# Patient Record
Sex: FEMALE | Race: BLACK OR AFRICAN AMERICAN | Employment: OTHER | URBAN - METROPOLITAN AREA
[De-identification: names, ages, dates, MRNs, and addresses within clinical notes are randomized per-mention and may not be internally consistent; named-entity substitution may affect disease eponyms.]

---

## 2024-03-15 RX ORDER — AMLODIPINE BESYLATE 5 MG/1
TABLET ORAL
COMMUNITY
Start: 2023-11-01 | End: 2024-03-19 | Stop reason: ALTCHOICE

## 2024-03-15 RX ORDER — NEBIVOLOL 10 MG/1
TABLET ORAL
COMMUNITY
Start: 2023-11-01

## 2024-03-15 RX ORDER — OMEPRAZOLE 20 MG/1
TABLET, DELAYED RELEASE ORAL
COMMUNITY
Start: 2024-03-10

## 2024-03-15 RX ORDER — AMLODIPINE BESYLATE VALSARTAN HYDROCHLOROTHIAZIDE 10; 25; 320 MG/1; MG/1; MG/1
TABLET, FILM COATED ORAL
COMMUNITY
Start: 2023-11-01 | End: 2024-03-19

## 2024-03-15 SDOH — ECONOMIC STABILITY: FOOD INSECURITY: WITHIN THE PAST 12 MONTHS, YOU WORRIED THAT YOUR FOOD WOULD RUN OUT BEFORE YOU GOT MONEY TO BUY MORE.: NEVER TRUE

## 2024-03-15 SDOH — ECONOMIC STABILITY: HOUSING INSECURITY
IN THE LAST 12 MONTHS, WAS THERE A TIME WHEN YOU DID NOT HAVE A STEADY PLACE TO SLEEP OR SLEPT IN A SHELTER (INCLUDING NOW)?: NO

## 2024-03-15 SDOH — ECONOMIC STABILITY: INCOME INSECURITY: HOW HARD IS IT FOR YOU TO PAY FOR THE VERY BASICS LIKE FOOD, HOUSING, MEDICAL CARE, AND HEATING?: NOT VERY HARD

## 2024-03-15 SDOH — ECONOMIC STABILITY: TRANSPORTATION INSECURITY
IN THE PAST 12 MONTHS, HAS LACK OF TRANSPORTATION KEPT YOU FROM MEETINGS, WORK, OR FROM GETTING THINGS NEEDED FOR DAILY LIVING?: NO

## 2024-03-15 SDOH — ECONOMIC STABILITY: FOOD INSECURITY: WITHIN THE PAST 12 MONTHS, THE FOOD YOU BOUGHT JUST DIDN'T LAST AND YOU DIDN'T HAVE MONEY TO GET MORE.: NEVER TRUE

## 2024-03-15 SDOH — HEALTH STABILITY: PHYSICAL HEALTH: ON AVERAGE, HOW MANY DAYS PER WEEK DO YOU ENGAGE IN MODERATE TO STRENUOUS EXERCISE (LIKE A BRISK WALK)?: 0 DAYS

## 2024-03-15 SDOH — HEALTH STABILITY: PHYSICAL HEALTH: ON AVERAGE, HOW MANY MINUTES DO YOU ENGAGE IN EXERCISE AT THIS LEVEL?: 0 MIN

## 2024-03-15 ASSESSMENT — PATIENT HEALTH QUESTIONNAIRE - PHQ9
9. THOUGHTS THAT YOU WOULD BE BETTER OFF DEAD, OR OF HURTING YOURSELF: NOT AT ALL
6. FEELING BAD ABOUT YOURSELF - OR THAT YOU ARE A FAILURE OR HAVE LET YOURSELF OR YOUR FAMILY DOWN: NOT AT ALL
5. POOR APPETITE OR OVEREATING: NOT AT ALL
SUM OF ALL RESPONSES TO PHQ QUESTIONS 1-9: 5
SUM OF ALL RESPONSES TO PHQ QUESTIONS 1-9: 5
7. TROUBLE CONCENTRATING ON THINGS, SUCH AS READING THE NEWSPAPER OR WATCHING TELEVISION: NOT AT ALL
5. POOR APPETITE OR OVEREATING: NOT AT ALL
2. FEELING DOWN, DEPRESSED OR HOPELESS: SEVERAL DAYS
1. LITTLE INTEREST OR PLEASURE IN DOING THINGS: SEVERAL DAYS
3. TROUBLE FALLING OR STAYING ASLEEP: NOT AT ALL
7. TROUBLE CONCENTRATING ON THINGS, SUCH AS READING THE NEWSPAPER OR WATCHING TELEVISION: NOT AT ALL
SUM OF ALL RESPONSES TO PHQ QUESTIONS 1-9: 5
SUM OF ALL RESPONSES TO PHQ9 QUESTIONS 1 & 2: 2
10. IF YOU CHECKED OFF ANY PROBLEMS, HOW DIFFICULT HAVE THESE PROBLEMS MADE IT FOR YOU TO DO YOUR WORK, TAKE CARE OF THINGS AT HOME, OR GET ALONG WITH OTHER PEOPLE: SOMEWHAT DIFFICULT
8. MOVING OR SPEAKING SO SLOWLY THAT OTHER PEOPLE COULD HAVE NOTICED. OR THE OPPOSITE - BEING SO FIDGETY OR RESTLESS THAT YOU HAVE BEEN MOVING AROUND A LOT MORE THAN USUAL: NOT AT ALL
1. LITTLE INTEREST OR PLEASURE IN DOING THINGS: SEVERAL DAYS
2. FEELING DOWN, DEPRESSED OR HOPELESS: SEVERAL DAYS
4. FEELING TIRED OR HAVING LITTLE ENERGY: NEARLY EVERY DAY
SUM OF ALL RESPONSES TO PHQ QUESTIONS 1-9: 5
4. FEELING TIRED OR HAVING LITTLE ENERGY: NEARLY EVERY DAY
6. FEELING BAD ABOUT YOURSELF - OR THAT YOU ARE A FAILURE OR HAVE LET YOURSELF OR YOUR FAMILY DOWN: NOT AT ALL
10. IF YOU CHECKED OFF ANY PROBLEMS, HOW DIFFICULT HAVE THESE PROBLEMS MADE IT FOR YOU TO DO YOUR WORK, TAKE CARE OF THINGS AT HOME, OR GET ALONG WITH OTHER PEOPLE: SOMEWHAT DIFFICULT
9. THOUGHTS THAT YOU WOULD BE BETTER OFF DEAD, OR OF HURTING YOURSELF: NOT AT ALL
3. TROUBLE FALLING OR STAYING ASLEEP: NOT AT ALL
8. MOVING OR SPEAKING SO SLOWLY THAT OTHER PEOPLE COULD HAVE NOTICED. OR THE OPPOSITE, BEING SO FIGETY OR RESTLESS THAT YOU HAVE BEEN MOVING AROUND A LOT MORE THAN USUAL: NOT AT ALL
SUM OF ALL RESPONSES TO PHQ QUESTIONS 1-9: 5

## 2024-03-19 ENCOUNTER — OFFICE VISIT (OUTPATIENT)
Age: 69
End: 2024-03-19
Payer: MEDICARE

## 2024-03-19 ENCOUNTER — HOSPITAL ENCOUNTER (OUTPATIENT)
Facility: HOSPITAL | Age: 69
Discharge: HOME | End: 2024-03-22
Payer: MEDICARE

## 2024-03-19 VITALS
BODY MASS INDEX: 50.03 KG/M2 | TEMPERATURE: 99.4 F | RESPIRATION RATE: 18 BRPM | HEIGHT: 61 IN | SYSTOLIC BLOOD PRESSURE: 108 MMHG | HEART RATE: 82 BPM | WEIGHT: 265 LBS | OXYGEN SATURATION: 98 % | DIASTOLIC BLOOD PRESSURE: 80 MMHG

## 2024-03-19 DIAGNOSIS — R60.9 PITTING EDEMA: ICD-10-CM

## 2024-03-19 DIAGNOSIS — L97.911 ULCER OF RIGHT LOWER EXTREMITY, LIMITED TO BREAKDOWN OF SKIN (HCC): ICD-10-CM

## 2024-03-19 DIAGNOSIS — R06.02 SOB (SHORTNESS OF BREATH) ON EXERTION: ICD-10-CM

## 2024-03-19 DIAGNOSIS — Z12.11 COLON CANCER SCREENING: ICD-10-CM

## 2024-03-19 DIAGNOSIS — R73.09 ELEVATED HEMOGLOBIN A1C: ICD-10-CM

## 2024-03-19 DIAGNOSIS — Z76.89 ENCOUNTER TO ESTABLISH CARE: Primary | ICD-10-CM

## 2024-03-19 LAB
ALBUMIN SERPL-MCNC: 3.3 G/DL (ref 3.4–5)
ALBUMIN/GLOB SERPL: 0.7 (ref 0.8–1.7)
ALP SERPL-CCNC: 84 U/L (ref 45–117)
ALT SERPL-CCNC: 27 U/L (ref 13–56)
ANION GAP SERPL CALC-SCNC: 9 MMOL/L (ref 3–18)
AST SERPL-CCNC: 56 U/L (ref 10–38)
BASOPHILS # BLD: 0 K/UL (ref 0–0.1)
BASOPHILS NFR BLD: 1 % (ref 0–2)
BILIRUB SERPL-MCNC: 0.9 MG/DL (ref 0.2–1)
BUN SERPL-MCNC: 7 MG/DL (ref 7–18)
BUN/CREAT SERPL: 8 (ref 12–20)
CALCIUM SERPL-MCNC: 8.9 MG/DL (ref 8.5–10.1)
CHLORIDE SERPL-SCNC: 106 MMOL/L (ref 100–111)
CHOLEST SERPL-MCNC: 138 MG/DL
CO2 SERPL-SCNC: 29 MMOL/L (ref 21–32)
CREAT SERPL-MCNC: 0.86 MG/DL (ref 0.6–1.3)
CREAT UR-MCNC: 351 MG/DL (ref 30–125)
DIFFERENTIAL METHOD BLD: ABNORMAL
EKG DIAGNOSIS: NORMAL
EKG Q-T INTERVAL: 484 MS
EKG QRS DURATION: 84 MS
EKG QTC CALCULATION (BAZETT): 463 MS
EKG R AXIS: -16 DEGREES
EKG T AXIS: 37 DEGREES
EKG VENTRICULAR RATE: 55 BPM
EOSINOPHIL # BLD: 0.2 K/UL (ref 0–0.4)
EOSINOPHIL NFR BLD: 3 % (ref 0–5)
ERYTHROCYTE [DISTWIDTH] IN BLOOD BY AUTOMATED COUNT: 15.1 % (ref 11.6–14.5)
EST. AVERAGE GLUCOSE BLD GHB EST-MCNC: 131 MG/DL
GLOBULIN SER CALC-MCNC: 4.5 G/DL (ref 2–4)
GLUCOSE SERPL-MCNC: 110 MG/DL (ref 74–99)
HBA1C MFR BLD: 6.2 % (ref 4.2–5.6)
HCT VFR BLD AUTO: 39.3 % (ref 35–45)
HDLC SERPL-MCNC: 45 MG/DL (ref 40–60)
HDLC SERPL: 3.1 (ref 0–5)
HGB BLD-MCNC: 12.3 G/DL (ref 12–16)
IMM GRANULOCYTES # BLD AUTO: 0 K/UL (ref 0–0.04)
IMM GRANULOCYTES NFR BLD AUTO: 0 % (ref 0–0.5)
LDLC SERPL CALC-MCNC: 70.4 MG/DL (ref 0–100)
LIPID PANEL: NORMAL
LYMPHOCYTES # BLD: 2.1 K/UL (ref 0.9–3.6)
LYMPHOCYTES NFR BLD: 35 % (ref 21–52)
MCH RBC QN AUTO: 27.6 PG (ref 24–34)
MCHC RBC AUTO-ENTMCNC: 31.3 G/DL (ref 31–37)
MCV RBC AUTO: 88.3 FL (ref 78–100)
MICROALBUMIN UR-MCNC: 7.89 MG/DL (ref 0–3)
MICROALBUMIN/CREAT UR-RTO: 22 MG/G (ref 0–30)
MONOCYTES # BLD: 0.5 K/UL (ref 0.05–1.2)
MONOCYTES NFR BLD: 9 % (ref 3–10)
NEUTS SEG # BLD: 3.2 K/UL (ref 1.8–8)
NEUTS SEG NFR BLD: 53 % (ref 40–73)
NRBC # BLD: 0 K/UL (ref 0–0.01)
NRBC BLD-RTO: 0 PER 100 WBC
PLATELET # BLD AUTO: 128 K/UL (ref 135–420)
PMV BLD AUTO: 13 FL (ref 9.2–11.8)
POTASSIUM SERPL-SCNC: 3.1 MMOL/L (ref 3.5–5.5)
PROT SERPL-MCNC: 7.8 G/DL (ref 6.4–8.2)
RBC # BLD AUTO: 4.45 M/UL (ref 4.2–5.3)
SODIUM SERPL-SCNC: 144 MMOL/L (ref 136–145)
TRIGL SERPL-MCNC: 113 MG/DL
VLDLC SERPL CALC-MCNC: 22.6 MG/DL
WBC # BLD AUTO: 6 K/UL (ref 4.6–13.2)

## 2024-03-19 PROCEDURE — 93010 ELECTROCARDIOGRAM REPORT: CPT | Performed by: INTERNAL MEDICINE

## 2024-03-19 PROCEDURE — G8484 FLU IMMUNIZE NO ADMIN: HCPCS | Performed by: FAMILY MEDICINE

## 2024-03-19 PROCEDURE — 1036F TOBACCO NON-USER: CPT | Performed by: FAMILY MEDICINE

## 2024-03-19 PROCEDURE — 1090F PRES/ABSN URINE INCON ASSESS: CPT | Performed by: FAMILY MEDICINE

## 2024-03-19 PROCEDURE — 82043 UR ALBUMIN QUANTITATIVE: CPT

## 2024-03-19 PROCEDURE — G8417 CALC BMI ABV UP PARAM F/U: HCPCS | Performed by: FAMILY MEDICINE

## 2024-03-19 PROCEDURE — 83036 HEMOGLOBIN GLYCOSYLATED A1C: CPT

## 2024-03-19 PROCEDURE — 93005 ELECTROCARDIOGRAM TRACING: CPT | Performed by: FAMILY MEDICINE

## 2024-03-19 PROCEDURE — 80061 LIPID PANEL: CPT

## 2024-03-19 PROCEDURE — 80053 COMPREHEN METABOLIC PANEL: CPT

## 2024-03-19 PROCEDURE — G8400 PT W/DXA NO RESULTS DOC: HCPCS | Performed by: FAMILY MEDICINE

## 2024-03-19 PROCEDURE — 1123F ACP DISCUSS/DSCN MKR DOCD: CPT | Performed by: FAMILY MEDICINE

## 2024-03-19 PROCEDURE — 82570 ASSAY OF URINE CREATININE: CPT

## 2024-03-19 PROCEDURE — 3017F COLORECTAL CA SCREEN DOC REV: CPT | Performed by: FAMILY MEDICINE

## 2024-03-19 PROCEDURE — 99204 OFFICE O/P NEW MOD 45 MIN: CPT | Performed by: FAMILY MEDICINE

## 2024-03-19 PROCEDURE — 36415 COLL VENOUS BLD VENIPUNCTURE: CPT

## 2024-03-19 PROCEDURE — G8427 DOCREV CUR MEDS BY ELIG CLIN: HCPCS | Performed by: FAMILY MEDICINE

## 2024-03-19 PROCEDURE — 85025 COMPLETE CBC W/AUTO DIFF WBC: CPT

## 2024-03-19 RX ORDER — VALSARTAN AND HYDROCHLOROTHIAZIDE 320; 25 MG/1; MG/1
1 TABLET, FILM COATED ORAL DAILY
COMMUNITY

## 2024-03-19 RX ORDER — POTASSIUM CHLORIDE 20 MEQ/1
20 TABLET, EXTENDED RELEASE ORAL 2 TIMES DAILY
COMMUNITY
Start: 2024-03-14

## 2024-03-19 RX ORDER — MULTIVITAMIN WITH IRON
250 TABLET ORAL DAILY
COMMUNITY

## 2024-03-19 RX ORDER — ACETAMINOPHEN 160 MG
2000 TABLET,DISINTEGRATING ORAL DAILY
COMMUNITY

## 2024-03-19 NOTE — PROGRESS NOTES
Anne-Marie Mosqueda is a 68 y.o. female  Chief Complaint   Patient presents with    New Patient     Est care     Vitals:    03/19/24 1017   BP: 108/80   Pulse: 82   Resp: 18   Temp: 99.4 °F (37.4 °C)   SpO2: 98%       \"Have you been to the ER, urgent care clinic since your last visit?  Hospitalized since your last visit?\"    NO    “Have you seen or consulted any other health care providers outside of [facility] since your last visit?”    YES - When: approximately 3 months ago.  Where and Why: Dr. Charity Avalos with [facility].    Have you had a mammogram?”   YES - Where: [facility]- January 2024 Nurse/CMA to request most recent records if not in the chart      “Have you had a colorectal cancer screening such as a colonoscopy/FIT/Cologuard?    NO    No colonoscopy on file  No cologuard on file  No FIT/FOBT on file   No flexible sigmoidoscopy on file         Click Here for Release of Records Request    
     General: Normal range of motion.      Cervical back: Normal range of motion and neck supple.      Right lower le+ Edema present.      Left lower le+ Edema present.   Skin:     General: Skin is warm.      Capillary Refill: Capillary refill takes less than 2 seconds.      Findings: Lesion present. No erythema or rash.   Neurological:      General: No focal deficit present.      Mental Status: She is alert and oriented to person, place, and time. Mental status is at baseline.      Motor: Weakness present.      Coordination: Coordination normal.      Gait: Gait abnormal.   Psychiatric:         Mood and Affect: Mood normal.         Behavior: Behavior normal.         Thought Content: Thought content normal.         Judgment: Judgment normal.           There is no immunization history on file for this patient.    Health Maintenance   Topic Date Due    COVID-19 Vaccine (1) Never done    Hepatitis C screen  Never done    DTaP/Tdap/Td vaccine (1 - Tdap) Never done    Breast cancer screen  Never done    Colorectal Cancer Screen  Never done    Shingles vaccine (1 of 2) Never done    DEXA (modify frequency per FRAX score)  Never done    Respiratory Syncytial Virus (RSV) Pregnant or age 60 yrs+ (1 - 1-dose 60+ series) Never done    Pneumococcal 65+ years Vaccine (1 of 1 - PCV) Never done    Flu vaccine (1) Never done    Annual Wellness Visit (Medicare)  Never done    Depression Screen  03/15/2025    A1C test (Diabetic or Prediabetic)  2025    Lipids  2029    Hepatitis A vaccine  Aged Out    Hepatitis B vaccine  Aged Out    Hib vaccine  Aged Out    Polio vaccine  Aged Out    Meningococcal (ACWY) vaccine  Aged Out    Diabetes screen  Discontinued       PSH, PMH, SH and  reviewed and noted.  Recent and past labs, tests and consults also reviewed.  Recent or new meds also reviewed.    Linda Parks MD    This dictation was generated by voice recognition computer software.  Although all attempts are

## 2024-03-20 ASSESSMENT — ENCOUNTER SYMPTOMS
SHORTNESS OF BREATH: 1
ABDOMINAL PAIN: 0
EYE PAIN: 0
DIARRHEA: 0
CONSTIPATION: 0
COUGH: 0

## 2024-05-13 NOTE — PROGRESS NOTES
[facility]  Preoperative visit   2024       Patient is here for preop evaluation at the request of [facility] for oral surgery (dental implants). Is scheduled for surgery on 24.    LOV 3/19/24 addressed shortness of breath, BL LE edema. Reviewed labs with patient- elevated A1c 6.2%. Reviewed normal echo with patient- EF 55-60% with normal wall motion. EKG shows NSR.     Functional Assessment:  Exercise tolerance: >4 METS using the DASI calculator   Denies any current chest pain or discomfort, sob or RODRÍGUEZ, orthopnea, PND or leg swelling.      Medications: reviewed, Over the counter (NSAIDs) use: no     Cardiac Risk:  High-risk Surgery: no / Hx of ischemic heart disease: no / Hx of CHF: no / Hx of CVA: no / Pre-op insulin: no / Pre-op creatinine >2.0: no (last cr 0.86)     History of surgery/ anesthesia:  - No hx of complications, anesthesia reaction, bleeding, bruising.   - No family Hx of reactions to anesthesia/ bleeding/clots  - No dentures, loose teeth  - Support systems after surgery: Father - Chino Mancini  - Smoking/ alcohol/drugs: no  - Complicating medical diseases are currently well controlled.      Patient Active Problem List   Diagnosis    Thrombocytopenia, unspecified (HCC)    Primary hypertension        Past Medical History:   Diagnosis Date    Cancer (HCC)     BREAST    Headache     Hypertension     MEDS LISTED    Liver disease     FATTY LIVER    Neuropathy     FEET AND NUBNESS IN HANDS    Obesity     BMI 50       Past Surgical History:   Procedure Laterality Date     SECTION               Current Outpatient Medications on File Prior to Visit   Medication Sig Dispense Refill    metFORMIN (GLUCOPHAGE) 500 MG tablet Take 1 tablet by mouth daily (with breakfast)      potassium chloride (KLOR-CON M) 20 MEQ extended release tablet Take 1 tablet by mouth 2 times daily      magnesium (MAGNESIUM-OXIDE) 250 MG TABS tablet Take 1 tablet by mouth daily      vitamin D

## 2024-05-14 ENCOUNTER — OFFICE VISIT (OUTPATIENT)
Age: 69
End: 2024-05-14
Payer: MEDICARE

## 2024-05-14 VITALS
SYSTOLIC BLOOD PRESSURE: 132 MMHG | RESPIRATION RATE: 18 BRPM | WEIGHT: 261 LBS | OXYGEN SATURATION: 96 % | HEIGHT: 61 IN | TEMPERATURE: 97.9 F | BODY MASS INDEX: 49.28 KG/M2 | HEART RATE: 76 BPM | DIASTOLIC BLOOD PRESSURE: 80 MMHG

## 2024-05-14 VITALS
HEIGHT: 61 IN | SYSTOLIC BLOOD PRESSURE: 132 MMHG | HEART RATE: 76 BPM | TEMPERATURE: 97.9 F | RESPIRATION RATE: 18 BRPM | OXYGEN SATURATION: 96 % | BODY MASS INDEX: 49.28 KG/M2 | WEIGHT: 261 LBS | DIASTOLIC BLOOD PRESSURE: 80 MMHG

## 2024-05-14 DIAGNOSIS — L97.911 ULCER OF RIGHT LOWER EXTREMITY, LIMITED TO BREAKDOWN OF SKIN (HCC): ICD-10-CM

## 2024-05-14 DIAGNOSIS — R60.0 BILATERAL LEG EDEMA: ICD-10-CM

## 2024-05-14 DIAGNOSIS — I10 PRIMARY HYPERTENSION: ICD-10-CM

## 2024-05-14 DIAGNOSIS — Z00.00 INITIAL MEDICARE ANNUAL WELLNESS VISIT: Primary | ICD-10-CM

## 2024-05-14 DIAGNOSIS — Z01.818 PREOP EXAMINATION: Primary | ICD-10-CM

## 2024-05-14 PROBLEM — D69.6 THROMBOCYTOPENIA, UNSPECIFIED (HCC): Status: ACTIVE | Noted: 2024-05-14

## 2024-05-14 PROCEDURE — 1090F PRES/ABSN URINE INCON ASSESS: CPT | Performed by: FAMILY MEDICINE

## 2024-05-14 PROCEDURE — G8427 DOCREV CUR MEDS BY ELIG CLIN: HCPCS | Performed by: FAMILY MEDICINE

## 2024-05-14 PROCEDURE — 3075F SYST BP GE 130 - 139MM HG: CPT | Performed by: FAMILY MEDICINE

## 2024-05-14 PROCEDURE — G8400 PT W/DXA NO RESULTS DOC: HCPCS | Performed by: FAMILY MEDICINE

## 2024-05-14 PROCEDURE — 3079F DIAST BP 80-89 MM HG: CPT | Performed by: FAMILY MEDICINE

## 2024-05-14 PROCEDURE — 1036F TOBACCO NON-USER: CPT | Performed by: FAMILY MEDICINE

## 2024-05-14 PROCEDURE — 3017F COLORECTAL CA SCREEN DOC REV: CPT | Performed by: FAMILY MEDICINE

## 2024-05-14 PROCEDURE — G0438 PPPS, INITIAL VISIT: HCPCS | Performed by: FAMILY MEDICINE

## 2024-05-14 PROCEDURE — 99214 OFFICE O/P EST MOD 30 MIN: CPT | Performed by: FAMILY MEDICINE

## 2024-05-14 PROCEDURE — G8417 CALC BMI ABV UP PARAM F/U: HCPCS | Performed by: FAMILY MEDICINE

## 2024-05-14 PROCEDURE — 1123F ACP DISCUSS/DSCN MKR DOCD: CPT | Performed by: FAMILY MEDICINE

## 2024-05-14 ASSESSMENT — PATIENT HEALTH QUESTIONNAIRE - PHQ9
SUM OF ALL RESPONSES TO PHQ QUESTIONS 1-9: 1
2. FEELING DOWN, DEPRESSED OR HOPELESS: SEVERAL DAYS
1. LITTLE INTEREST OR PLEASURE IN DOING THINGS: NOT AT ALL
SUM OF ALL RESPONSES TO PHQ QUESTIONS 1-9: 1
SUM OF ALL RESPONSES TO PHQ9 QUESTIONS 1 & 2: 1
SUM OF ALL RESPONSES TO PHQ QUESTIONS 1-9: 1
SUM OF ALL RESPONSES TO PHQ QUESTIONS 1-9: 1

## 2024-05-14 ASSESSMENT — ENCOUNTER SYMPTOMS
SHORTNESS OF BREATH: 0
DIARRHEA: 0
CONSTIPATION: 0
ABDOMINAL PAIN: 0

## 2024-05-14 NOTE — PROGRESS NOTES
\"Have you been to the ER, urgent care clinic since your last visit?  Hospitalized since your last visit?\"    NO    “Have you seen or consulted any other health care providers outside of [facility] since your last visit?”    NO    Have you had a mammogram?”   YES - Where: 1/2024 Nurse/CMA to request most recent records if not in the chart    No breast cancer screening on file         “Have you had a colorectal cancer screening such as a colonoscopy/FIT/Cologuard?    NO July 2024    No colonoscopy on file  No cologuard on file  No FIT/FOBT on file   No flexible sigmoidoscopy on file         Click Here for Release of Records Request

## 2024-05-14 NOTE — PROGRESS NOTES
Medicare Annual Wellness Visit    Anne-Marie Mosqueda is here for Medicare AWV    Assessment & Plan   Initial Medicare annual wellness visit  Recommendations for Preventive Services Due: see orders and patient instructions/AVS.  Recommended screening schedule for the next 5-10 years is provided to the patient in written form: see Patient Instructions/AVS.     Return in 1 year (on 5/14/2025).     Subjective   Patient's complete Health Risk Assessment and screening values have been reviewed and are found in Flowsheets. The following problems were reviewed today and where indicated follow up appointments were made and/or referrals ordered.    Positive Risk Factor Screenings with Interventions:                Activity, Diet, and Weight:               Body mass index is 49.32 kg/m². (!) Abnormal      Inactivity Interventions:  Patient declined any further interventions or treatment  Obesity Interventions:  Patient advised to follow-up in this office for further evaluation and treatment                 Objective   Vitals:    05/14/24 1023   BP: 132/80   Site: Left Upper Arm   Position: Sitting   Cuff Size: Medium Adult   Pulse: 76   Resp: 18   Temp: 97.9 °F (36.6 °C)   TempSrc: Temporal   SpO2: 96%   Weight: 118.4 kg (261 lb)   Height: 1.549 m (5' 1\")      Body mass index is 49.32 kg/m².            Allergies   Allergen Reactions    Sulfa Antibiotics Itching     Causes anxiety      Prior to Visit Medications    Medication Sig Taking? Authorizing Provider   metFORMIN (GLUCOPHAGE) 500 MG tablet Take 1 tablet by mouth daily (with breakfast) Yes Guy Posadas MD   potassium chloride (KLOR-CON M) 20 MEQ extended release tablet Take 1 tablet by mouth 2 times daily Yes Guy Posadas MD   magnesium (MAGNESIUM-OXIDE) 250 MG TABS tablet Take 1 tablet by mouth daily Yes Guy Posadas MD   vitamin D (VITAMIN D3) 50 MCG (2000 UT) CAPS capsule Take 1 capsule by mouth daily Yes Guy Posadas MD

## 2024-05-14 NOTE — PROGRESS NOTES
\"Have you been to the ER, urgent care clinic since your last visit?  Hospitalized since your last visit?\"    NO    “Have you seen or consulted any other health care providers outside of [facility] since your last visit?”    NO    Have you had a mammogram?”   YES - Where: 1/2024 Nurse/CMA to request most recent records if not in the chart    No breast cancer screening on file         “Have you had a colorectal cancer screening such as a colonoscopy/FIT/Cologuard?    NO scheduled for 7/2024    No colonoscopy on file  No cologuard on file  No FIT/FOBT on file   No flexible sigmoidoscopy on file         Click Here for Release of Records Request

## 2024-06-06 ENCOUNTER — OFFICE VISIT (OUTPATIENT)
Age: 69
End: 2024-06-06

## 2024-06-06 VITALS
HEART RATE: 80 BPM | OXYGEN SATURATION: 99 % | WEIGHT: 261 LBS | HEIGHT: 61 IN | BODY MASS INDEX: 49.28 KG/M2 | DIASTOLIC BLOOD PRESSURE: 70 MMHG | SYSTOLIC BLOOD PRESSURE: 100 MMHG

## 2024-06-06 DIAGNOSIS — I89.0 LYMPHEDEMA OF BOTH LOWER EXTREMITIES: Primary | ICD-10-CM

## 2024-06-10 ENCOUNTER — NURSE ONLY (OUTPATIENT)
Age: 69
End: 2024-06-10

## 2024-06-10 DIAGNOSIS — R60.9 EDEMA: Primary | ICD-10-CM

## 2024-06-10 NOTE — PROGRESS NOTES
[facility]  [address],  [address]  [facility] [address]  Dept: [phone]           Chart reviewed for the following:   Vandana CARREON MA, have reviewed the medications and updated the Allergic reactions for Anne-Marie Mosqueda     TIME OUT performed immediately prior to start of procedure:   Vandana CARRENO MA, have performed the following reviews on Anne-Marie Mosqueda prior to the start of the procedure:            * Patient was identified by name and date of birth   * Agreement that daysi boot removed and reapplied   * Procedure site verified   * Patient was positioned for comfort  * Verbal consent was given by patient     Time: 1115      Date of procedure: 6/10/2024    Procedure performed by:  [facility] NURSE    How tolerated by patient:    C/o some itching and pain in the wound                                                                                 Comments:  Applied hydrocortisone, aquaphor, adaptic, and K2 to both legs.

## 2024-06-13 ENCOUNTER — NURSE ONLY (OUTPATIENT)
Age: 69
End: 2024-06-13

## 2024-06-13 DIAGNOSIS — R60.9 EDEMA: Primary | ICD-10-CM

## 2024-06-13 NOTE — PROGRESS NOTES
[facility]  [address],  [address]  [facility] [address]  Dept: [phone]           Chart reviewed for the following:   Vandana CARRENO MA, have reviewed the medications and updated the Allergic reactions for Anne-Marie Mosqueda     TIME OUT performed immediately prior to start of procedure:   Vandana CARRENO MA, have performed the following reviews on Anne-Marie Mosqueda prior to the start of the procedure:            * Patient was identified by name and date of birth   * Agreement that daysi boot removed and reapplied   * Procedure site verified   * Patient was positioned for comfort  * Verbal consent was given by patient     Time: 1130      Date of procedure: 6/13/2024    Procedure performed by:  [facility] NURSE    How tolerated by patient: Increased swelling. Wound almost completely dry.                                                                                      Comments:  Applied telfa, Aquaphor, to right leg and K2. Aquaphor and K2 to left leg.

## 2024-06-17 ENCOUNTER — NURSE ONLY (OUTPATIENT)
Age: 69
End: 2024-06-17

## 2024-06-17 DIAGNOSIS — R60.9 EDEMA, UNSPECIFIED TYPE: Primary | ICD-10-CM

## 2024-06-17 NOTE — PROGRESS NOTES
[facility]  [address],  [address]  [facility] [address]  Dept: [phone]           Chart reviewed for the following:   Leona CARRENO, have reviewed the medications and updated the Allergic reactions for Anne-Marie Mosqueda     TIME OUT performed immediately prior to start of procedure:   Leona CARRENO, have performed the following reviews on Anne-Marie Mosqueda prior to the start of the procedure:            * Patient was identified by name and date of birth   * Agreement that daysi boot removed and reapplied   * Procedure site verified   * Patient was positioned for comfort  * Verbal consent was given by patient     Time: 1115      Date of procedure: 6/17/2024    Procedure performed by:  [facility] NURSE    How tolerated by patient: Pt tolerated well had no drainage had small amount of dead skin.                                                                                     Comments:  Washed Pt legs thoroughly, applied Aquaphor to both legs. Applied Telfa to right leg, fitted Pt with bilateral K2 wraps.

## 2024-06-20 ENCOUNTER — NURSE ONLY (OUTPATIENT)
Age: 69
End: 2024-06-20

## 2024-06-20 DIAGNOSIS — R60.9 EDEMA, UNSPECIFIED TYPE: Primary | ICD-10-CM

## 2024-06-20 NOTE — PROGRESS NOTES
Pt rtc for K2 dressing change. She still has significant swelling and a small wound to the shin. She c/o pain to the top of her right ankle. She states her cardiologist gave her lasix about a month ago but she does not see any changes. I advised her to call the cardiologist. I did not put her back into the K2 compression, instead I put her in double tubigrip F. She has a follow up with Dr. Mac next week.

## 2024-06-26 ENCOUNTER — OFFICE VISIT (OUTPATIENT)
Age: 69
End: 2024-06-26
Payer: MEDICARE

## 2024-06-26 VITALS
DIASTOLIC BLOOD PRESSURE: 60 MMHG | OXYGEN SATURATION: 99 % | BODY MASS INDEX: 49.28 KG/M2 | HEART RATE: 74 BPM | HEIGHT: 61 IN | WEIGHT: 261 LBS | SYSTOLIC BLOOD PRESSURE: 124 MMHG

## 2024-06-26 DIAGNOSIS — L97.911 NON-PRESSURE CHRONIC ULCER RIGHT LOWER LEG, LIMITED TO BREAKDOWN SKIN (HCC): ICD-10-CM

## 2024-06-26 DIAGNOSIS — I89.0 LYMPHEDEMA: Primary | ICD-10-CM

## 2024-06-26 PROCEDURE — G8417 CALC BMI ABV UP PARAM F/U: HCPCS | Performed by: SURGERY

## 2024-06-26 PROCEDURE — G8400 PT W/DXA NO RESULTS DOC: HCPCS | Performed by: SURGERY

## 2024-06-26 PROCEDURE — 3078F DIAST BP <80 MM HG: CPT | Performed by: SURGERY

## 2024-06-26 PROCEDURE — 3074F SYST BP LT 130 MM HG: CPT | Performed by: SURGERY

## 2024-06-26 PROCEDURE — 1123F ACP DISCUSS/DSCN MKR DOCD: CPT | Performed by: SURGERY

## 2024-06-26 PROCEDURE — 99213 OFFICE O/P EST LOW 20 MIN: CPT | Performed by: SURGERY

## 2024-06-26 PROCEDURE — 3017F COLORECTAL CA SCREEN DOC REV: CPT | Performed by: SURGERY

## 2024-06-26 PROCEDURE — G8427 DOCREV CUR MEDS BY ELIG CLIN: HCPCS | Performed by: SURGERY

## 2024-06-26 PROCEDURE — 1036F TOBACCO NON-USER: CPT | Performed by: SURGERY

## 2024-06-26 PROCEDURE — 1090F PRES/ABSN URINE INCON ASSESS: CPT | Performed by: SURGERY

## 2024-06-26 NOTE — PROGRESS NOTES
[facility]  [address],  [address]  [facility] [address]  Dept: [phone]           Chart reviewed for the following:   Vandana CARRENO MA, have reviewed the medications and updated the Allergic reactions for Anne-Marie Mosqueda     TIME OUT performed immediately prior to start of procedure:   Vandana CARRENO MA, have performed the following reviews on Anne-Marie Mosqueda prior to the start of the procedure:            * Patient was identified by name and date of birth   * Agreement that daysi boot removed and reapplied   * Procedure site verified   * Patient was positioned for comfort  * Verbal consent was given by patient     Time: 1600      Date of procedure: 6/26/2024    Procedure performed by:  Marina Betancourt MD    How tolerated by patient: No complaints                                                                                      Comments:  Applied hydrogel, telfa, and unna boot with kerlix and coban to right leg. Unna boot with Kerlix and coban to left leg.

## 2024-07-02 ENCOUNTER — NURSE ONLY (OUTPATIENT)
Age: 69
End: 2024-07-02

## 2024-07-02 DIAGNOSIS — R60.9 EDEMA, UNSPECIFIED TYPE: Primary | ICD-10-CM

## 2024-07-02 NOTE — PROGRESS NOTES
Pt RTC for dressing change. She has significantly improved. All wounds are healed. We transitioned to Tubigrip F and she will come back next week for a [employer] Demo. Reminded her to continue to elevate and to wear the Tubigrip daily. Also measured her for possible Circaids.

## 2024-07-11 ENCOUNTER — OFFICE VISIT (OUTPATIENT)
Age: 69
End: 2024-07-11
Payer: MEDICARE

## 2024-07-11 VITALS
OXYGEN SATURATION: 96 % | HEART RATE: 76 BPM | HEIGHT: 61 IN | WEIGHT: 275 LBS | DIASTOLIC BLOOD PRESSURE: 68 MMHG | SYSTOLIC BLOOD PRESSURE: 120 MMHG | BODY MASS INDEX: 51.92 KG/M2

## 2024-07-11 DIAGNOSIS — E66.9 LYMPHEDEMA ASSOCIATED WITH OBESITY: ICD-10-CM

## 2024-07-11 DIAGNOSIS — I89.0 LYMPHEDEMA: Primary | ICD-10-CM

## 2024-07-11 DIAGNOSIS — I89.0 LYMPHEDEMA ASSOCIATED WITH OBESITY: ICD-10-CM

## 2024-07-11 PROCEDURE — 3078F DIAST BP <80 MM HG: CPT | Performed by: SURGERY

## 2024-07-11 PROCEDURE — 1123F ACP DISCUSS/DSCN MKR DOCD: CPT | Performed by: SURGERY

## 2024-07-11 PROCEDURE — G8417 CALC BMI ABV UP PARAM F/U: HCPCS | Performed by: SURGERY

## 2024-07-11 PROCEDURE — G8427 DOCREV CUR MEDS BY ELIG CLIN: HCPCS | Performed by: SURGERY

## 2024-07-11 PROCEDURE — 3074F SYST BP LT 130 MM HG: CPT | Performed by: SURGERY

## 2024-07-11 PROCEDURE — 3017F COLORECTAL CA SCREEN DOC REV: CPT | Performed by: SURGERY

## 2024-07-11 PROCEDURE — 1036F TOBACCO NON-USER: CPT | Performed by: SURGERY

## 2024-07-11 PROCEDURE — 1090F PRES/ABSN URINE INCON ASSESS: CPT | Performed by: SURGERY

## 2024-07-11 PROCEDURE — 99213 OFFICE O/P EST LOW 20 MIN: CPT | Performed by: SURGERY

## 2024-07-11 PROCEDURE — G8400 PT W/DXA NO RESULTS DOC: HCPCS | Performed by: SURGERY

## 2024-07-11 NOTE — PROGRESS NOTES
Anne-Marie Mosqueda    Chief Complaint   Patient presents with    Bilateral leg edema     Follow Up, LE Day       History and Physical    Anne-Marie Mosqueda is a 69 y.o. female with PMH significant for hypertension, GERD, non-insulin-dependent diabetes,.     Since her last visit:   - has seen cardiologist for worsening SOB. External note reviewed. Medication changes were made and she is following up in a couple of weeks.  - has been wearing her compression stockings since her visit on 24 with improvement of her edema    States her wound has closed    Today:       Last visit:        Initial visit:             Previously obtained venous history:   she presents today for right lower extremity ulceration, referred by Dr Parks.     Today she describes BLE edema and a right leg ulcer. She states that she is having difficulty donning compression stockings.     Edema has been present for years, is constant and improves significantly overnight. Edema then worsens throughout the day. Denies pain.     With regards to the RLE wound, she states this started 3 months ago as a scratch but has worsened since then. She has been applying neosporin daily.    Wound is painful with a deep dull ache. She takes tylenol or advil which helps sometimes. Wound does not keep her up at night. Wound drains yellow, clear fluid.       Associated symptoms:   [x] edema  [] varicose veins  [] heaviness/aching  [] fatigue  [] Pain  [x] H/o or current ulcer(s)    Relevant history:   [x] female gender  [x] Family history of venous disease: mother had varicose veins and lymphedema  [x] history of pregnancy:   [] history of DVT/PE  [] history of vein procedure  [] Personal or family history of coronary artery disease      Pertinent edema history:  [] CHF/pulmonary HTN  [] GIO/snoring  [] CKD  [] Pulmonary disease  [x] Obesity   [] Activity level    Smoking status: never smoker    No pertinent imaging studies available for review    The following

## 2024-07-12 NOTE — TELEPHONE ENCOUNTER
This patient contacted office for the following prescriptions to be filled:    Medication requested : Nebivolol 10MG  PCP: Dr. parks  Pharmacy or Print:  Pharmacy  Mail order or Local pharmacy  [facility]    Scheduled appointment if not seen by current providers in office: 11/24/2024            This patient contacted office for the following prescriptions to be filled:    Medication requested : Metformin   PCP: Dr. Parks  Pharmacy or Print:  Pharmacy  Mail order or Local pharmacy [facility]    Scheduled appointment if not seen by current providers in office:  11/24/2024

## 2024-07-18 NOTE — TELEPHONE ENCOUNTER
Was called in on Friday 7/12/2024 and now pt is out of these medication   metFORMIN (GLUCOPHAGE) 500 MG tablet QTY 90  nebivolol (BYSTOLIC) 10 MG tablet QTY 90  [facility] [address] . Please advise. Thank you

## 2024-07-19 NOTE — TELEPHONE ENCOUNTER
This patient contacted the office for the following prescriptions to be refilled:    Medication requested :   Requested Prescriptions     Pending Prescriptions Disp Refills    metFORMIN (GLUCOPHAGE) 500 MG tablet 60 tablet 3     Sig: Take 1 tablet by mouth daily (with breakfast)    nebivolol (BYSTOLIC) 10 MG tablet 30 tablet 3        Last Refilled: Metformin 3/14/2024, Bystolic 11/1/2023    Last Office Visit: 5/14/2024    Next Office Visit: 11/14/2024    Last Labs: 3/19/2024

## 2024-07-22 RX ORDER — NEBIVOLOL 10 MG/1
TABLET ORAL
Qty: 90 TABLET | Refills: 3 | Status: SHIPPED | OUTPATIENT
Start: 2024-07-22

## 2024-11-13 NOTE — PROGRESS NOTES
Anne-Marie Mosqueda    Chief Complaint   Patient presents with    Lymphedema     4 Month Follow UP       History and Physical    Anne-Marie Mosqueda is a 69 y.o. female with PMH significant for hypertension, GERD, non-insulin-dependent diabetes,.     Since her last visit:   - has been wearing her compression stockings since her visit on 24 with improvement of her edema    States her wound has remained closed    Today:      Last visit:       Initial visit:             Previously obtained venous history:   she presents today for right lower extremity ulceration, referred by Dr Parks.     Today she describes BLE edema and a right leg ulcer. She states that she is having difficulty donning compression stockings.     Edema has been present for years, is constant and improves significantly overnight. Edema then worsens throughout the day. Denies pain.     With regards to the RLE wound, she states this started 3 months ago as a scratch but has worsened since then. She has been applying neosporin daily.    Wound is painful with a deep dull ache. She takes tylenol or advil which helps sometimes. Wound does not keep her up at night. Wound drains yellow, clear fluid.       Associated symptoms:   [x] edema  [] varicose veins  [] heaviness/aching  [] fatigue  [] Pain  [x] H/o or current ulcer(s)    Relevant history:   [x] female gender  [x] Family history of venous disease: mother had varicose veins and lymphedema  [x] history of pregnancy:   [] history of DVT/PE  [] history of vein procedure  [] Personal or family history of coronary artery disease      Pertinent edema history:  [] CHF/pulmonary HTN  [] GIO/snoring  [] CKD  [] Pulmonary disease  [x] Obesity   [] Activity level    Smoking status: never smoker    No pertinent imaging studies available for review    The following labs were reviewed:   Lab Results   Component Value Date     2024    K 3.1 (L) 2024     2024    CO2 29 2024

## 2024-11-14 ENCOUNTER — OFFICE VISIT (OUTPATIENT)
Age: 69
End: 2024-11-14

## 2024-11-14 ENCOUNTER — OFFICE VISIT (OUTPATIENT)
Facility: CLINIC | Age: 69
End: 2024-11-14

## 2024-11-14 VITALS
BODY MASS INDEX: 48.33 KG/M2 | HEART RATE: 100 BPM | SYSTOLIC BLOOD PRESSURE: 120 MMHG | WEIGHT: 256 LBS | OXYGEN SATURATION: 98 % | HEIGHT: 61 IN | DIASTOLIC BLOOD PRESSURE: 72 MMHG

## 2024-11-14 VITALS
WEIGHT: 256 LBS | TEMPERATURE: 98.2 F | BODY MASS INDEX: 48.33 KG/M2 | DIASTOLIC BLOOD PRESSURE: 64 MMHG | HEIGHT: 61 IN | HEART RATE: 72 BPM | SYSTOLIC BLOOD PRESSURE: 120 MMHG | OXYGEN SATURATION: 98 % | RESPIRATION RATE: 16 BRPM

## 2024-11-14 DIAGNOSIS — Z12.31 BREAST CANCER SCREENING BY MAMMOGRAM: ICD-10-CM

## 2024-11-14 DIAGNOSIS — R73.09 ELEVATED HEMOGLOBIN A1C: ICD-10-CM

## 2024-11-14 DIAGNOSIS — I89.0 LYMPHEDEMA OF BOTH LOWER EXTREMITIES: ICD-10-CM

## 2024-11-14 DIAGNOSIS — E66.01 CLASS 3 SEVERE OBESITY DUE TO EXCESS CALORIES WITH SERIOUS COMORBIDITY AND BODY MASS INDEX (BMI) OF 45.0 TO 49.9 IN ADULT: Primary | ICD-10-CM

## 2024-11-14 DIAGNOSIS — I50.32 CHRONIC HEART FAILURE WITH PRESERVED EJECTION FRACTION (HCC): ICD-10-CM

## 2024-11-14 DIAGNOSIS — Z78.0 POSTMENOPAUSAL: ICD-10-CM

## 2024-11-14 DIAGNOSIS — I10 PRIMARY HYPERTENSION: ICD-10-CM

## 2024-11-14 DIAGNOSIS — E66.813 CLASS 3 SEVERE OBESITY DUE TO EXCESS CALORIES WITH SERIOUS COMORBIDITY AND BODY MASS INDEX (BMI) OF 45.0 TO 49.9 IN ADULT: Primary | ICD-10-CM

## 2024-11-14 DIAGNOSIS — I89.0 LYMPHEDEMA: Primary | ICD-10-CM

## 2024-11-14 RX ORDER — POTASSIUM CHLORIDE 1500 MG/1
20 TABLET, EXTENDED RELEASE ORAL DAILY
Qty: 60 TABLET | Refills: 3 | OUTPATIENT
Start: 2024-11-14

## 2024-11-14 RX ORDER — SPIRONOLACTONE 25 MG/1
25 TABLET ORAL DAILY
COMMUNITY
Start: 2024-08-27

## 2024-11-14 RX ORDER — CHLORHEXIDINE GLUCONATE ORAL RINSE 1.2 MG/ML
15 SOLUTION DENTAL 2 TIMES DAILY
COMMUNITY
Start: 2024-09-20

## 2024-11-14 RX ORDER — AMOXICILLIN 500 MG/1
500 TABLET, FILM COATED ORAL
COMMUNITY
Start: 2024-09-20

## 2024-11-14 RX ORDER — VALSARTAN AND HYDROCHLOROTHIAZIDE 320; 25 MG/1; MG/1
1 TABLET, FILM COATED ORAL DAILY
Qty: 30 TABLET | Refills: 3 | OUTPATIENT
Start: 2024-11-14

## 2024-11-14 RX ORDER — OXYCODONE HYDROCHLORIDE 5 MG/1
5 TABLET ORAL EVERY 6 HOURS PRN
COMMUNITY
Start: 2024-09-20 | End: 2024-11-14

## 2024-11-14 ASSESSMENT — PATIENT HEALTH QUESTIONNAIRE - PHQ9
2. FEELING DOWN, DEPRESSED OR HOPELESS: NOT AT ALL
SUM OF ALL RESPONSES TO PHQ QUESTIONS 1-9: 0
1. LITTLE INTEREST OR PLEASURE IN DOING THINGS: NOT AT ALL
SUM OF ALL RESPONSES TO PHQ9 QUESTIONS 1 & 2: 0
SUM OF ALL RESPONSES TO PHQ QUESTIONS 1-9: 0

## 2024-11-14 ASSESSMENT — ENCOUNTER SYMPTOMS
DIARRHEA: 0
CONSTIPATION: 0
COUGH: 0
SHORTNESS OF BREATH: 0
EYE PAIN: 0
ABDOMINAL PAIN: 0

## 2024-11-14 NOTE — PATIENT INSTRUCTIONS
Please start doing the following:     - Moisturize legs daily (Eucerin or Aquaphor cream for extremely dry skin)

## 2024-11-14 NOTE — PROGRESS NOTES
[facility]  2024    Anne-Marie Mosqueda (: 1955) is a 69 y.o. female, here for the following medical concerns:    No chief complaint on file.       ASSESSMENT/ PLAN  1. Class 3 severe obesity due to excess calories with serious comorbidity and body mass index (BMI) of 45.0 to 49.9 in adult  Uncontrolled. Complicating all aspects of patient care. Discussed healthy lifestyle modifications to implement.     2. Primary hypertension  Controlled: Appears stable.  We will continue current management and monitor for adverse reaction and disease progression.  Follow-up as noted below  - CBC with Auto Differential; Future  - Comprehensive Metabolic Panel; Future    3. Elevated hemoglobin A1c  Update and treat accordingly.   - Hemoglobin A1C; Future    4. Lymphedema of both lower extremities  Stable and slowly improving with the help of vascular recommendations. Has been having trouble with contacting [employer]    5. Chronic heart failure with preserved ejection fraction (HCC)  Stable and shortness of breath resolved. Continue management with cardio. Discussed jardiance addition and she will start the samples provided by cardio.     6. Postmenopausal  - DEXA BONE DENSITY AXIAL SKELETON; Future  - Vitamin D 25 Hydroxy; Future    7. Breast cancer screening by mammogram  - [facility] DIGITAL SCREEN W OR WO CAD BILATERAL; Future       I have spent 30 minutes on chart review, care coordination and patient counseling regarding disease state, lifestyle modifications and/or health maintenance screening.      Return in about 6 months (around 2025).    Future Appointments   Date Time Provider Department Center   2024  2:00 PM Marina Betancourt MD [facility] AMB   5/15/2025 11:30 AM Linda Parks MD [facility] BS ECC DEP         HPI  LOV 24 preop for dental surgery    Previous OV 3/19/24 addressed shortness of breath, pitting edema, RLE ulcer and elevated A1c. Updated echo, EKG and blood

## 2024-11-14 NOTE — PROGRESS NOTES
\"Have you been to the ER, urgent care clinic since your last visit?  Hospitalized since your last visit?\"    NO    “Have you seen or consulted any other health care providers outside our system since your last visit?”    YES - When: approximately 2 months ago.  Where and Why: [facility] 9/5/2024, .    Have you had a mammogram?”   YES - 1/2024 in [place]  Nurse/CMA to request most recent records if not in the chart    Date of last Mammogram: 10/10/2018

## 2025-03-19 NOTE — PROGRESS NOTES
Anne-Marie Mosqueda    Chief Complaint   Patient presents with    Lymphedema     3 Month Follow Up       History and Physical    Anne-Marie Mosqueda is a 70 y.o. female with PMH significant for hypertension, GERD, non-insulin-dependent diabetes,.     Since her last visit:   - has not been using her lymphedema pump because she thought she had to use it twice a day  - feeling overwhelmed with the care of her father  - has been wearing her compression stockings     States her wound has remained closed    Today:      Last visit:        Initial visit:             Previously obtained venous history:   she presents today for right lower extremity ulceration, referred by Dr Parks.     Today she describes BLE edema and a right leg ulcer. She states that she is having difficulty donning compression stockings.     Edema has been present for years, is constant and improves significantly overnight. Edema then worsens throughout the day. Denies pain.     With regards to the RLE wound, she states this started 3 months ago as a scratch but has worsened since then. She has been applying neosporin daily.    Wound is painful with a deep dull ache. She takes tylenol or advil which helps sometimes. Wound does not keep her up at night. Wound drains yellow, clear fluid.       Associated symptoms:   [x] edema  [] varicose veins  [] heaviness/aching  [] fatigue  [] Pain  [x] H/o or current ulcer(s)    Relevant history:   [x] female gender  [x] Family history of venous disease: mother had varicose veins and lymphedema  [x] history of pregnancy:   [] history of DVT/PE  [] history of vein procedure  [] Personal or family history of coronary artery disease      Pertinent edema history:  [] CHF/pulmonary HTN  [] GIO/snoring  [] CKD  [] Pulmonary disease  [x] Obesity   [] Activity level    Smoking status: never smoker    No pertinent imaging studies available for review    The following labs were reviewed:   Lab Results   Component Value Date

## 2025-03-25 NOTE — TELEPHONE ENCOUNTER
This patient contacted the office for the following prescriptions to be refilled:    Medication requested :   Requested Prescriptions     Pending Prescriptions Disp Refills    metFORMIN (GLUCOPHAGE) 500 MG tablet [Pharmacy Med Name: METFORMIN 500MG TABLETS] 60 tablet 3     Sig: TAKE 1 TABLET BY MOUTH DAILY WITH BREAKFAST        Last Refilled: 7/22/2024    Last Office Visit: 11/14/2024    Next Office Visit: 5/15/2025    Last Labs: 3/19/2024

## 2025-05-12 ENCOUNTER — OFFICE VISIT (OUTPATIENT)
Age: 70
End: 2025-05-12
Payer: MEDICARE

## 2025-05-12 VITALS
DIASTOLIC BLOOD PRESSURE: 60 MMHG | HEIGHT: 61 IN | HEART RATE: 78 BPM | WEIGHT: 256 LBS | OXYGEN SATURATION: 98 % | SYSTOLIC BLOOD PRESSURE: 100 MMHG | BODY MASS INDEX: 48.33 KG/M2

## 2025-05-12 DIAGNOSIS — I89.0 LYMPHEDEMA: Primary | ICD-10-CM

## 2025-05-12 PROCEDURE — G8417 CALC BMI ABV UP PARAM F/U: HCPCS | Performed by: SURGERY

## 2025-05-12 PROCEDURE — 3074F SYST BP LT 130 MM HG: CPT | Performed by: SURGERY

## 2025-05-12 PROCEDURE — G8427 DOCREV CUR MEDS BY ELIG CLIN: HCPCS | Performed by: SURGERY

## 2025-05-12 PROCEDURE — 1159F MED LIST DOCD IN RCRD: CPT | Performed by: SURGERY

## 2025-05-12 PROCEDURE — 3017F COLORECTAL CA SCREEN DOC REV: CPT | Performed by: SURGERY

## 2025-05-12 PROCEDURE — 99213 OFFICE O/P EST LOW 20 MIN: CPT | Performed by: SURGERY

## 2025-05-12 PROCEDURE — 3078F DIAST BP <80 MM HG: CPT | Performed by: SURGERY

## 2025-05-12 PROCEDURE — G8400 PT W/DXA NO RESULTS DOC: HCPCS | Performed by: SURGERY

## 2025-05-12 PROCEDURE — 1036F TOBACCO NON-USER: CPT | Performed by: SURGERY

## 2025-05-12 PROCEDURE — 1123F ACP DISCUSS/DSCN MKR DOCD: CPT | Performed by: SURGERY

## 2025-05-12 PROCEDURE — 1090F PRES/ABSN URINE INCON ASSESS: CPT | Performed by: SURGERY

## 2025-05-12 PROCEDURE — 1160F RVW MEDS BY RX/DR IN RCRD: CPT | Performed by: SURGERY

## 2025-05-12 NOTE — PATIENT INSTRUCTIONS
Please start using your lymphedema pump 3x/week   Wear your compression stockings daily    Elevate legs as much as possible

## 2025-05-15 ENCOUNTER — OFFICE VISIT (OUTPATIENT)
Facility: CLINIC | Age: 70
End: 2025-05-15

## 2025-05-15 VITALS
SYSTOLIC BLOOD PRESSURE: 120 MMHG | WEIGHT: 257 LBS | BODY MASS INDEX: 48.52 KG/M2 | HEIGHT: 61 IN | HEART RATE: 74 BPM | RESPIRATION RATE: 16 BRPM | TEMPERATURE: 97.9 F | OXYGEN SATURATION: 100 % | DIASTOLIC BLOOD PRESSURE: 68 MMHG

## 2025-05-15 DIAGNOSIS — G56.91 NEUROPATHY OF RIGHT HAND: ICD-10-CM

## 2025-05-15 DIAGNOSIS — E66.813 CLASS 3 SEVERE OBESITY DUE TO EXCESS CALORIES WITH SERIOUS COMORBIDITY AND BODY MASS INDEX (BMI) OF 45.0 TO 49.9 IN ADULT (HCC): ICD-10-CM

## 2025-05-15 DIAGNOSIS — I10 PRIMARY HYPERTENSION: ICD-10-CM

## 2025-05-15 DIAGNOSIS — R10.84 GENERALIZED ABDOMINAL PAIN: ICD-10-CM

## 2025-05-15 DIAGNOSIS — R73.09 ELEVATED HEMOGLOBIN A1C: ICD-10-CM

## 2025-05-15 DIAGNOSIS — Z00.00 MEDICARE ANNUAL WELLNESS VISIT, SUBSEQUENT: Primary | ICD-10-CM

## 2025-05-15 RX ORDER — GABAPENTIN 100 MG/1
100 CAPSULE ORAL 2 TIMES DAILY PRN
Qty: 180 CAPSULE | Refills: 0 | Status: SHIPPED | OUTPATIENT
Start: 2025-05-15 | End: 2025-08-13

## 2025-05-15 SDOH — ECONOMIC STABILITY: FOOD INSECURITY: WITHIN THE PAST 12 MONTHS, THE FOOD YOU BOUGHT JUST DIDN'T LAST AND YOU DIDN'T HAVE MONEY TO GET MORE.: NEVER TRUE

## 2025-05-15 SDOH — ECONOMIC STABILITY: FOOD INSECURITY: WITHIN THE PAST 12 MONTHS, YOU WORRIED THAT YOUR FOOD WOULD RUN OUT BEFORE YOU GOT MONEY TO BUY MORE.: NEVER TRUE

## 2025-05-15 ASSESSMENT — PATIENT HEALTH QUESTIONNAIRE - PHQ9
SUM OF ALL RESPONSES TO PHQ QUESTIONS 1-9: 1
1. LITTLE INTEREST OR PLEASURE IN DOING THINGS: NOT AT ALL
SUM OF ALL RESPONSES TO PHQ QUESTIONS 1-9: 1
2. FEELING DOWN, DEPRESSED OR HOPELESS: SEVERAL DAYS
SUM OF ALL RESPONSES TO PHQ QUESTIONS 1-9: 1
SUM OF ALL RESPONSES TO PHQ QUESTIONS 1-9: 1

## 2025-05-15 ASSESSMENT — LIFESTYLE VARIABLES
HOW OFTEN DO YOU HAVE A DRINK CONTAINING ALCOHOL: NEVER
HOW MANY STANDARD DRINKS CONTAINING ALCOHOL DO YOU HAVE ON A TYPICAL DAY: PATIENT DOES NOT DRINK

## 2025-05-15 NOTE — PROGRESS NOTES
Medicare Annual Wellness Visit    Anne-Marie Mosqueda is here for Medicare AWV, GI Problem, Fatigue, and Numbness    Assessment & Plan   Medicare annual wellness visit, subsequent  -     ESTABLISHED, MOD MDM, 30-39 MIN [90130]  Neuropathy of right hand  -     gabapentin (NEURONTIN) 100 MG capsule; Take 1 capsule by mouth 2 times daily as needed (right hand neuropathy) for up to 90 days. Intended supply: 90 days Max Daily Amount: 200 mg, Disp-180 capsule, R-0Normal  Primary hypertension  -     Comprehensive Metabolic Panel; Future  -     CBC with Auto Differential; Future  -     Comprehensive Metabolic Panel; Future  -     Lipid Panel; Future  Elevated hemoglobin A1c  -     Hemoglobin A1C; Future  Class 3 severe obesity due to excess calories with serious comorbidity and body mass index (BMI) of 45.0 to 49.9 in adult (HCC)  Generalized abdominal pain  Continue to monitor this since it is improving. Discussed increasing water intake to 2 bottle per day and if she feels constipated, will add miralax.      I have spent 30 minutes on chart review, care coordination and patient counseling regarding disease state, lifestyle modifications and/or health maintenance screening.      Return in about 6 months (around 11/15/2025).     Subjective   The following acute and/or chronic problems were also addressed today:  Abd pain started bothering her mid April. Feels like she has to have a BM and gets cramps. It lasts for days. Having a BM will help her feel better.   She is feeling better this last week and is having normal Bms.     Acid reflux is controlled.     Right hand neuropathy for years and getting worse over time. No meds for this. No brace. She will massage it to make it feel better. Maintains sensation but difficulty with burning pain.     Patient's complete Health Risk Assessment and screening values have been reviewed and are found in Flowsheets. The following problems were reviewed today and where indicated follow up

## 2025-05-15 NOTE — PATIENT INSTRUCTIONS
Learning About Being Active as an Older Adult  Why is being active important as you get older?     Being active is one of the best things you can do for your health. And it's never too late to start. Being active--or getting active, if you aren't already--has definite benefits. It can:  Give you more energy,  Keep your mind sharp.  Improve balance to reduce your risk of falls.  Help you manage chronic illness with fewer medicines.  No matter how old you are, how fit you are, or what health problems you have, there is a form of activity that will work for you. And the more physical activity you can do, the better your overall health will be.  What kinds of activity can help you stay healthy?  Being more active will make your daily activities easier. Physical activity includes planned exercise and things you do in daily life. There are four types of activity:  Aerobic.  Doing aerobic activity makes your heart and lungs strong.  Includes walking, dancing, and gardening.  Aim for at least 2½ hours spread throughout the week.  It improves your energy and can help you sleep better.  Muscle-strengthening.  This type of activity can help maintain muscle and strengthen bones.  Includes climbing stairs, using resistance bands, and lifting or carrying heavy loads.  Aim for at least twice a week.  It can help protect the knees and other joints.  Stretching.  Stretching gives you better range of motion in joints and muscles.  Includes upper arm stretches, calf stretches, and gentle yoga.  Aim for at least twice a week, preferably after your muscles are warmed up from other activities.  It can help you function better in daily life.  Balancing.  This helps you stay coordinated and have good posture.  Includes heel-to-toe walking, monet chi, and certain types of yoga.  Aim for at least 3 days a week.  It can reduce your risk of falling.  Even if you have a hard time meeting the recommendations, it's better to be more active

## 2025-05-15 NOTE — PROGRESS NOTES
Have you been to the ER, urgent care clinic since your last visit?  Hospitalized since your last visit?   NO    Have you seen or consulted any other health care providers outside our system since your last visit?   NO    Have you had a mammogram?”   NO will schedule     Date of last Mammogram: 10/10/2018

## 2025-06-11 ENCOUNTER — OFFICE VISIT (OUTPATIENT)
Age: 70
End: 2025-06-11
Payer: MEDICARE

## 2025-06-11 VITALS
HEART RATE: 56 BPM | OXYGEN SATURATION: 96 % | DIASTOLIC BLOOD PRESSURE: 70 MMHG | SYSTOLIC BLOOD PRESSURE: 114 MMHG | WEIGHT: 256 LBS | HEIGHT: 61 IN | BODY MASS INDEX: 48.33 KG/M2

## 2025-06-11 DIAGNOSIS — I89.0 LYMPHEDEMA: Primary | ICD-10-CM

## 2025-06-11 PROCEDURE — 3074F SYST BP LT 130 MM HG: CPT | Performed by: SURGERY

## 2025-06-11 PROCEDURE — 99213 OFFICE O/P EST LOW 20 MIN: CPT | Performed by: SURGERY

## 2025-06-11 PROCEDURE — G8427 DOCREV CUR MEDS BY ELIG CLIN: HCPCS | Performed by: SURGERY

## 2025-06-11 PROCEDURE — 1036F TOBACCO NON-USER: CPT | Performed by: SURGERY

## 2025-06-11 PROCEDURE — G8417 CALC BMI ABV UP PARAM F/U: HCPCS | Performed by: SURGERY

## 2025-06-11 PROCEDURE — G8400 PT W/DXA NO RESULTS DOC: HCPCS | Performed by: SURGERY

## 2025-06-11 PROCEDURE — 1090F PRES/ABSN URINE INCON ASSESS: CPT | Performed by: SURGERY

## 2025-06-11 PROCEDURE — 3017F COLORECTAL CA SCREEN DOC REV: CPT | Performed by: SURGERY

## 2025-06-11 PROCEDURE — 1160F RVW MEDS BY RX/DR IN RCRD: CPT | Performed by: SURGERY

## 2025-06-11 PROCEDURE — 1123F ACP DISCUSS/DSCN MKR DOCD: CPT | Performed by: SURGERY

## 2025-06-11 PROCEDURE — 3078F DIAST BP <80 MM HG: CPT | Performed by: SURGERY

## 2025-06-11 PROCEDURE — 1159F MED LIST DOCD IN RCRD: CPT | Performed by: SURGERY

## 2025-06-11 NOTE — PROGRESS NOTES
Anne-Marie Mosqueda    Chief Complaint   Patient presents with    Lymphedema     Follow up        History and Physical    Anne-Marie Mosqueda is a 70 y.o. female with PMH significant for hypertension, GERD, non-insulin-dependent diabetes,.     Since her last visit:   - Now using her lymphedema pump 3x/week. 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  - feels her edema has improved significantly  - continues to wear compression stockings    States her wound has remained closed    Today:            Last visit:          Initial visit:             Previously obtained venous history:   she presents today for right lower extremity ulceration, referred by Dr Parks.     Today she describes BLE edema and a right leg ulcer. She states that she is having difficulty donning compression stockings.     Edema has been present for years, is constant and improves significantly overnight. Edema then worsens throughout the day. Denies pain.     With regards to the RLE wound, she states this started 3 months ago as a scratch but has worsened since then. She has been applying neosporin daily.    Wound is painful with a deep dull ache. She takes tylenol or advil which helps sometimes. Wound does not keep her up at night. Wound drains yellow, clear fluid.       Associated symptoms:   [x] edema  [] varicose veins  [] heaviness/aching  [] fatigue  [] Pain  [x] H/o or current ulcer(s)    Relevant history:   [x] female gender  [x] Family history of venous disease: mother had varicose veins and lymphedema  [x] history of pregnancy:   [] history of DVT/PE  [] history of vein procedure  [] Personal or family history of coronary artery disease      Pertinent edema history:  [] CHF/pulmonary HTN  [] GIO/snoring  [] CKD  [] Pulmonary disease  [x] Obesity   [] Activity level    Smoking status: never smoker    No pertinent imaging studies available for review    The following labs

## 2025-08-01 ENCOUNTER — PATIENT MESSAGE (OUTPATIENT)
Facility: CLINIC | Age: 70
End: 2025-08-01

## 2025-08-04 RX ORDER — SPIRONOLACTONE 25 MG/1
25 TABLET ORAL DAILY
Qty: 90 TABLET | Refills: 3 | Status: SHIPPED | OUTPATIENT
Start: 2025-08-04